# Patient Record
Sex: MALE | URBAN - NONMETROPOLITAN AREA
[De-identification: names, ages, dates, MRNs, and addresses within clinical notes are randomized per-mention and may not be internally consistent; named-entity substitution may affect disease eponyms.]

---

## 2019-04-24 ENCOUNTER — APPOINTMENT (RX ONLY)
Dept: URBAN - NONMETROPOLITAN AREA CLINIC 1 | Facility: CLINIC | Age: 79
Setting detail: DERMATOLOGY
End: 2019-04-24

## 2019-04-24 DIAGNOSIS — L30.9 DERMATITIS, UNSPECIFIED: ICD-10-CM

## 2019-04-24 PROBLEM — F32.9 MAJOR DEPRESSIVE DISORDER, SINGLE EPISODE, UNSPECIFIED: Status: ACTIVE | Noted: 2019-04-24

## 2019-04-24 PROBLEM — F41.9 ANXIETY DISORDER, UNSPECIFIED: Status: ACTIVE | Noted: 2019-04-24

## 2019-04-24 PROBLEM — I10 ESSENTIAL (PRIMARY) HYPERTENSION: Status: ACTIVE | Noted: 2019-04-24

## 2019-04-24 PROBLEM — E78.5 HYPERLIPIDEMIA, UNSPECIFIED: Status: ACTIVE | Noted: 2019-04-24

## 2019-04-24 PROBLEM — E13.9 OTHER SPECIFIED DIABETES MELLITUS WITHOUT COMPLICATIONS: Status: ACTIVE | Noted: 2019-04-24

## 2019-04-24 PROBLEM — L29.8 OTHER PRURITUS: Status: ACTIVE | Noted: 2019-04-24

## 2019-04-24 PROBLEM — K75.9 INFLAMMATORY LIVER DISEASE, UNSPECIFIED: Status: ACTIVE | Noted: 2019-04-24

## 2019-04-24 PROCEDURE — ? ADDITIONAL NOTES

## 2019-04-24 PROCEDURE — ? PRESCRIPTION

## 2019-04-24 PROCEDURE — ? COUNSELING

## 2019-04-24 PROCEDURE — 99202 OFFICE O/P NEW SF 15 MIN: CPT

## 2019-04-24 RX ORDER — MOMETASONE FUROATE 1 MG/G
OINTMENT TOPICAL
Qty: 1 | Refills: 2 | Status: ERX | COMMUNITY
Start: 2019-04-24

## 2019-04-24 RX ORDER — METHYLPREDNISOLONE 4 MG/1
TABLET ORAL
Qty: 1 | Refills: 0 | Status: ERX

## 2019-04-24 RX ADMIN — MOMETASONE FUROATE 1: 1 OINTMENT TOPICAL at 00:00

## 2019-04-24 ASSESSMENT — LOCATION ZONE DERM
LOCATION ZONE: ARM
LOCATION ZONE: LEG

## 2019-04-24 ASSESSMENT — LOCATION SIMPLE DESCRIPTION DERM
LOCATION SIMPLE: RIGHT THIGH
LOCATION SIMPLE: LEFT THIGH
LOCATION SIMPLE: RIGHT UPPER ARM
LOCATION SIMPLE: LEFT UPPER ARM

## 2019-04-24 ASSESSMENT — LOCATION DETAILED DESCRIPTION DERM
LOCATION DETAILED: LEFT ANTERIOR DISTAL UPPER ARM
LOCATION DETAILED: RIGHT ANTERIOR DISTAL UPPER ARM
LOCATION DETAILED: RIGHT ANTERIOR DISTAL THIGH
LOCATION DETAILED: LEFT ANTERIOR DISTAL THIGH

## 2019-04-24 NOTE — PROCEDURE: ADDITIONAL NOTES
Detail Level: Simple
Additional Notes: Pt will bring list of medication with start dates to try and figure out when began certain medications

## 2019-05-08 ENCOUNTER — APPOINTMENT (RX ONLY)
Dept: URBAN - NONMETROPOLITAN AREA CLINIC 1 | Facility: CLINIC | Age: 79
Setting detail: DERMATOLOGY
End: 2019-05-08

## 2019-05-08 DIAGNOSIS — L57.0 ACTINIC KERATOSIS: ICD-10-CM

## 2019-05-08 DIAGNOSIS — L30.9 DERMATITIS, UNSPECIFIED: ICD-10-CM

## 2019-05-08 DIAGNOSIS — D485 NEOPLASM OF UNCERTAIN BEHAVIOR OF SKIN: ICD-10-CM

## 2019-05-08 PROBLEM — D48.5 NEOPLASM OF UNCERTAIN BEHAVIOR OF SKIN: Status: ACTIVE | Noted: 2019-05-08

## 2019-05-08 PROCEDURE — ? BIOPSY BY SHAVE METHOD

## 2019-05-08 PROCEDURE — 11103 TANGNTL BX SKIN EA SEP/ADDL: CPT

## 2019-05-08 PROCEDURE — ? PRESCRIPTION

## 2019-05-08 PROCEDURE — 17000 DESTRUCT PREMALG LESION: CPT | Mod: 59

## 2019-05-08 PROCEDURE — ? ADDITIONAL NOTES

## 2019-05-08 PROCEDURE — 17003 DESTRUCT PREMALG LES 2-14: CPT

## 2019-05-08 PROCEDURE — ? COUNSELING

## 2019-05-08 PROCEDURE — 99213 OFFICE O/P EST LOW 20 MIN: CPT | Mod: 25

## 2019-05-08 PROCEDURE — 11102 TANGNTL BX SKIN SINGLE LES: CPT

## 2019-05-08 PROCEDURE — ? LIQUID NITROGEN

## 2019-05-08 RX ORDER — CLOBETASOL PROPIONATE 0.05 G/ML
SPRAY TOPICAL
Qty: 1 | Refills: 3 | Status: CANCELLED
Stop reason: CLARIF

## 2019-05-08 ASSESSMENT — LOCATION DETAILED DESCRIPTION DERM
LOCATION DETAILED: LEFT NASAL ALA
LOCATION DETAILED: LEFT SUPERIOR HELIX
LOCATION DETAILED: RIGHT MEDIAL MALAR CHEEK
LOCATION DETAILED: LEFT ULNAR DORSAL HAND
LOCATION DETAILED: LEFT CENTRAL MALAR CHEEK
LOCATION DETAILED: LEFT SUPERIOR POSTAURICULAR SKIN
LOCATION DETAILED: LEFT DISTAL DORSAL FOREARM
LOCATION DETAILED: RIGHT FOREHEAD
LOCATION DETAILED: LEFT ANTERIOR DISTAL THIGH
LOCATION DETAILED: RIGHT ANTERIOR DISTAL UPPER ARM
LOCATION DETAILED: RIGHT ANTERIOR DISTAL THIGH
LOCATION DETAILED: LEFT CENTRAL TEMPLE
LOCATION DETAILED: RIGHT SUPERIOR HELIX
LOCATION DETAILED: RIGHT PROXIMAL RADIAL DORSAL FOREARM
LOCATION DETAILED: LEFT ANTERIOR DISTAL UPPER ARM
LOCATION DETAILED: RIGHT PROXIMAL DORSAL FOREARM
LOCATION DETAILED: RIGHT NASAL ALA
LOCATION DETAILED: LEFT EYEBROW
LOCATION DETAILED: LEFT SUPERIOR MEDIAL MALAR CHEEK

## 2019-05-08 ASSESSMENT — LOCATION SIMPLE DESCRIPTION DERM
LOCATION SIMPLE: RIGHT FOREHEAD
LOCATION SIMPLE: LEFT EAR
LOCATION SIMPLE: LEFT HAND
LOCATION SIMPLE: LEFT FOREARM
LOCATION SIMPLE: LEFT TEMPLE
LOCATION SIMPLE: LEFT EYEBROW
LOCATION SIMPLE: LEFT THIGH
LOCATION SIMPLE: RIGHT FOREARM
LOCATION SIMPLE: RIGHT CHEEK
LOCATION SIMPLE: SCALP
LOCATION SIMPLE: LEFT UPPER ARM
LOCATION SIMPLE: RIGHT NOSE
LOCATION SIMPLE: RIGHT EAR
LOCATION SIMPLE: RIGHT UPPER ARM
LOCATION SIMPLE: LEFT NOSE
LOCATION SIMPLE: RIGHT THIGH
LOCATION SIMPLE: LEFT CHEEK

## 2019-05-08 ASSESSMENT — LOCATION ZONE DERM
LOCATION ZONE: SCALP
LOCATION ZONE: HAND
LOCATION ZONE: FACE
LOCATION ZONE: ARM
LOCATION ZONE: LEG
LOCATION ZONE: NOSE
LOCATION ZONE: EAR

## 2019-05-08 NOTE — PROCEDURE: BIOPSY BY SHAVE METHOD
Consent: Written consent was obtained and risk was reviewed  per signed  consent form. Biopsy was performed with patient verbal permission.
Lab: -[id number]
Curettage Text: The wound bed was treated with curettage after the biopsy was performed.
Detail Level: Detailed
Hemostasis: Aluminum Chloride
Bill For Surgical Tray: no
Biopsy Method: Personna blade
Notification Instructions: Patient will be notified of biopsy results. However, patient instructed to call the office if not contacted within 2 weeks.
Was A Bandage Applied: Yes
Silver Nitrate Text: The wound bed was treated with silver nitrate after the biopsy was performed.
Dressing: bandage
Post-care instructions were given and  reviewed with the patient in detail. Patient is to keep the biopsy site dry overnight, and then apply healing ointment daily until healed. Patient is instructed to call for any questions or problems.
Anesthesia Volume In Cc (Will Not Render If 0): 0.5
Electrodesiccation And Curettage Text: The wound bed was treated with electrodesiccation and curettage after the biopsy was performed.
Type Of Destruction Used: Curettage
Billing Type: Third-Party Bill
Biopsy Type: H and E
X Size Of Lesion In Cm: 0
Electrodesiccation Text: The wound bed was treated with electrodesiccation after the biopsy was performed.
Depth Of Biopsy: dermis
Wound Care: Petrolatum
Anesthesia Type: 2% lidocaine with epinephrine and a 1:10 solution of 8.4% sodium bicarbonate
Lab Facility: 3
Cryotherapy Text: The wound bed was treated with cryotherapy after the biopsy was performed.
Size Of Lesion In Cm: 1.2

## 2019-05-23 ENCOUNTER — APPOINTMENT (RX ONLY)
Dept: URBAN - NONMETROPOLITAN AREA CLINIC 1 | Facility: CLINIC | Age: 79
Setting detail: DERMATOLOGY
End: 2019-05-23

## 2019-05-23 PROBLEM — D03.61 MELANOMA IN SITU OF RIGHT UPPER LIMB, INCLUDING SHOULDER: Status: ACTIVE | Noted: 2019-05-23

## 2019-05-23 PROCEDURE — ? EXCISION

## 2019-05-23 PROCEDURE — 12032 INTMD RPR S/A/T/EXT 2.6-7.5: CPT

## 2019-05-23 PROCEDURE — 11604 EXC TR-EXT MAL+MARG 3.1-4 CM: CPT

## 2019-05-23 PROCEDURE — ? PRESCRIPTION

## 2019-05-23 RX ORDER — CEPHALEXIN 500 MG/1
CAPSULE ORAL
Qty: 15 | Refills: 0 | Status: ERX

## 2019-05-23 NOTE — PROCEDURE: EXCISION
Post-Care Instructions: Detailed post-care instructions were reviewed with the patient. Patient verbalized understanding not to engage in any heavy lifting, exercise, or swimming for the next 14 days. Should the patient develop any fevers, chills, bleeding, severe pain, or having any problems patient will contact the office at [phone] or urgent care immediately. Acetaminophen is recommended for pain relief . Patient should avoid  ibuprofen ( Advil, Motrin), aspirin, naproxen ( Aleve) .

## 2019-06-05 ENCOUNTER — APPOINTMENT (RX ONLY)
Dept: URBAN - NONMETROPOLITAN AREA CLINIC 1 | Facility: CLINIC | Age: 79
Setting detail: DERMATOLOGY
End: 2019-06-05

## 2019-06-05 DIAGNOSIS — Z48.02 ENCOUNTER FOR REMOVAL OF SUTURES: ICD-10-CM

## 2019-06-05 DIAGNOSIS — L30.9 DERMATITIS, UNSPECIFIED: ICD-10-CM

## 2019-06-05 PROCEDURE — 99213 OFFICE O/P EST LOW 20 MIN: CPT

## 2019-06-05 PROCEDURE — ? ADDITIONAL NOTES

## 2019-06-05 PROCEDURE — ? COUNSELING

## 2019-06-05 PROCEDURE — 99024 POSTOP FOLLOW-UP VISIT: CPT

## 2019-06-05 PROCEDURE — ? SUTURE REMOVAL (GLOBAL PERIOD)

## 2019-06-05 ASSESSMENT — LOCATION SIMPLE DESCRIPTION DERM
LOCATION SIMPLE: RIGHT FOREARM
LOCATION SIMPLE: RIGHT UPPER ARM
LOCATION SIMPLE: LEFT UPPER ARM

## 2019-06-05 ASSESSMENT — LOCATION ZONE DERM: LOCATION ZONE: ARM

## 2019-06-05 ASSESSMENT — LOCATION DETAILED DESCRIPTION DERM
LOCATION DETAILED: RIGHT ANTERIOR DISTAL UPPER ARM
LOCATION DETAILED: LEFT ANTERIOR DISTAL UPPER ARM
LOCATION DETAILED: RIGHT DISTAL DORSAL FOREARM

## 2019-06-05 NOTE — PROCEDURE: SUTURE REMOVAL (GLOBAL PERIOD)
Add 93681 Cpt? (Important Note: In 2017 The Use Of 13021 Is Being Tracked By Cms To Determine Future Global Period Reimbursement For Global Periods): yes
Detail Level: Detailed

## 2019-06-05 NOTE — PROCEDURE: ADDITIONAL NOTES
Detail Level: Simple
Additional Notes: Possible rash started from HCTZ and or Eplcusa. Will continue dermaZinc with clobetasol.
Additional Notes: Umpire in ointment applied to wound after suture removal and wrapped with Telfa, Kerlix, and a cohesive bandage.

## 2019-06-13 ENCOUNTER — APPOINTMENT (RX ONLY)
Dept: URBAN - NONMETROPOLITAN AREA CLINIC 1 | Facility: CLINIC | Age: 79
Setting detail: DERMATOLOGY
End: 2019-06-13

## 2019-06-13 PROBLEM — C44.311 BASAL CELL CARCINOMA OF SKIN OF NOSE: Status: ACTIVE | Noted: 2019-06-13

## 2019-06-13 PROBLEM — C44.329 SQUAMOUS CELL CARCINOMA OF SKIN OF OTHER PARTS OF FACE: Status: ACTIVE | Noted: 2019-06-13

## 2019-06-13 PROCEDURE — ? ADDITIONAL NOTES

## 2019-06-13 PROCEDURE — ? COUNSELING

## 2019-06-13 NOTE — PROCEDURE: ADDITIONAL NOTES
Detail Level: Detailed
Additional Notes: RTO 1 month for Mohs surgery.\\nFinish Hep C treatment.
Additional Notes: RTO for Mohs in 1 month.\\nFinish Hep C medication.

## 2019-08-06 ENCOUNTER — APPOINTMENT (RX ONLY)
Dept: URBAN - NONMETROPOLITAN AREA CLINIC 1 | Facility: CLINIC | Age: 79
Setting detail: DERMATOLOGY
End: 2019-08-06

## 2019-08-06 DIAGNOSIS — L81.4 OTHER MELANIN HYPERPIGMENTATION: ICD-10-CM

## 2019-08-06 DIAGNOSIS — D485 NEOPLASM OF UNCERTAIN BEHAVIOR OF SKIN: ICD-10-CM

## 2019-08-06 DIAGNOSIS — Z85.820 PERSONAL HISTORY OF MALIGNANT MELANOMA OF SKIN: ICD-10-CM

## 2019-08-06 DIAGNOSIS — D18.0 HEMANGIOMA: ICD-10-CM

## 2019-08-06 DIAGNOSIS — L57.0 ACTINIC KERATOSIS: ICD-10-CM

## 2019-08-06 DIAGNOSIS — D22 MELANOCYTIC NEVI: ICD-10-CM

## 2019-08-06 DIAGNOSIS — L82.1 OTHER SEBORRHEIC KERATOSIS: ICD-10-CM

## 2019-08-06 PROBLEM — D22.5 MELANOCYTIC NEVI OF TRUNK: Status: ACTIVE | Noted: 2019-08-06

## 2019-08-06 PROBLEM — D18.01 HEMANGIOMA OF SKIN AND SUBCUTANEOUS TISSUE: Status: ACTIVE | Noted: 2019-08-06

## 2019-08-06 PROBLEM — D48.5 NEOPLASM OF UNCERTAIN BEHAVIOR OF SKIN: Status: ACTIVE | Noted: 2019-08-06

## 2019-08-06 PROCEDURE — 17003 DESTRUCT PREMALG LES 2-14: CPT

## 2019-08-06 PROCEDURE — ? BIOPSY BY SHAVE METHOD

## 2019-08-06 PROCEDURE — ? COUNSELING

## 2019-08-06 PROCEDURE — ? LIQUID NITROGEN

## 2019-08-06 PROCEDURE — ? SUNSCREEN RECOMMENDATIONS

## 2019-08-06 PROCEDURE — 11102 TANGNTL BX SKIN SINGLE LES: CPT

## 2019-08-06 PROCEDURE — 99213 OFFICE O/P EST LOW 20 MIN: CPT | Mod: 25

## 2019-08-06 PROCEDURE — ? INVENTORY

## 2019-08-06 PROCEDURE — 17000 DESTRUCT PREMALG LESION: CPT | Mod: 59

## 2019-08-06 ASSESSMENT — LOCATION ZONE DERM
LOCATION ZONE: ARM
LOCATION ZONE: TRUNK
LOCATION ZONE: FACE
LOCATION ZONE: NOSE
LOCATION ZONE: EAR

## 2019-08-06 ASSESSMENT — LOCATION DETAILED DESCRIPTION DERM
LOCATION DETAILED: RIGHT ANTIHELIX
LOCATION DETAILED: RIGHT SCAPHA
LOCATION DETAILED: RIGHT TRAGUS
LOCATION DETAILED: LEFT VENTRAL PROXIMAL FOREARM
LOCATION DETAILED: RIGHT ANTERIOR DISTAL UPPER ARM
LOCATION DETAILED: RIGHT NASAL ALA
LOCATION DETAILED: LEFT MID-UPPER BACK
LOCATION DETAILED: LEFT INFERIOR UPPER BACK
LOCATION DETAILED: RIGHT CENTRAL MALAR CHEEK

## 2019-08-06 ASSESSMENT — LOCATION SIMPLE DESCRIPTION DERM
LOCATION SIMPLE: LEFT UPPER BACK
LOCATION SIMPLE: LEFT FOREARM
LOCATION SIMPLE: RIGHT EAR
LOCATION SIMPLE: RIGHT NOSE
LOCATION SIMPLE: RIGHT UPPER ARM
LOCATION SIMPLE: RIGHT CHEEK

## 2019-08-06 NOTE — PROCEDURE: BIOPSY BY SHAVE METHOD
Billing Type: Third-Party Bill
Lab: -[id number]
Curettage Text: The wound bed was treated with curettage after the biopsy was performed.
Destruction After The Procedure: No
Post-care instructions were given and  reviewed with the patient in detail. Patient is to keep the biopsy site dry overnight, and then apply healing ointment daily until healed. Patient is instructed to call for any questions or problems.
Cryotherapy Text: The wound bed was treated with cryotherapy after the biopsy was performed.
Size Of Lesion In Cm: 1.5
Anesthesia Type: 2% lidocaine with epinephrine and a 1:10 solution of 8.4% sodium bicarbonate
Lab Facility: 3
Depth Of Biopsy: dermis
Wound Care: Petrolatum
Additional Anesthesia Volume In Cc (Will Not Render If 0): 0
Electrodesiccation Text: The wound bed was treated with electrodesiccation after the biopsy was performed.
Notification Instructions: Patient will be notified of biopsy results. However, patient instructed to call the office if not contacted within 2 weeks.
Biopsy Type: H and E
Type Of Destruction Used: Curettage
Consent: Written consent was obtained and risk was reviewed  per signed  consent form. Biopsy was performed with patient verbal permission.
Electrodesiccation And Curettage Text: The wound bed was treated with electrodesiccation and curettage after the biopsy was performed.
Anesthesia Volume In Cc (Will Not Render If 0): 0.5
Render Post-Care Instructions In Note?: yes
Dressing: bandage
Silver Nitrate Text: The wound bed was treated with silver nitrate after the biopsy was performed.
Biopsy Method: Personna blade
Hemostasis: Aluminum Chloride
Detail Level: Detailed

## 2019-08-15 ENCOUNTER — APPOINTMENT (RX ONLY)
Dept: URBAN - NONMETROPOLITAN AREA CLINIC 1 | Facility: CLINIC | Age: 79
Setting detail: DERMATOLOGY
End: 2019-08-15

## 2019-08-15 VITALS — DIASTOLIC BLOOD PRESSURE: 64 MMHG | SYSTOLIC BLOOD PRESSURE: 128 MMHG | RESPIRATION RATE: 56 BRPM

## 2019-08-15 PROBLEM — C44.329 SQUAMOUS CELL CARCINOMA OF SKIN OF OTHER PARTS OF FACE: Status: ACTIVE | Noted: 2019-08-15

## 2019-08-15 PROBLEM — C44.311 BASAL CELL CARCINOMA OF SKIN OF NOSE: Status: ACTIVE | Noted: 2019-08-15

## 2019-08-15 PROCEDURE — 17312 MOHS ADDL STAGE: CPT | Mod: 79

## 2019-08-15 PROCEDURE — ? MOHS SURGERY

## 2019-08-15 PROCEDURE — 12052 INTMD RPR FACE/MM 2.6-5.0 CM: CPT | Mod: 79

## 2019-08-15 PROCEDURE — 17311 MOHS 1 STAGE H/N/HF/G: CPT | Mod: 76,79

## 2019-08-15 PROCEDURE — 17311 MOHS 1 STAGE H/N/HF/G: CPT | Mod: 79

## 2019-08-15 NOTE — HPI: PROCEDURE (MOHS)
Has The Growth Been Previously Biopsied?: has been previously biopsied
Body Location Override (Optional): Right nasal ala
Body Location Override (Optional): Left central temple

## 2019-08-16 ENCOUNTER — APPOINTMENT (RX ONLY)
Dept: URBAN - NONMETROPOLITAN AREA CLINIC 1 | Facility: CLINIC | Age: 79
Setting detail: DERMATOLOGY
End: 2019-08-16

## 2019-08-16 VITALS — SYSTOLIC BLOOD PRESSURE: 183 MMHG | DIASTOLIC BLOOD PRESSURE: 97 MMHG

## 2019-08-16 PROBLEM — C44.311 BASAL CELL CARCINOMA OF SKIN OF NOSE: Status: ACTIVE | Noted: 2019-08-16

## 2019-08-16 PROCEDURE — ? MOHS SURGERY

## 2019-08-16 PROCEDURE — 17311 MOHS 1 STAGE H/N/HF/G: CPT | Mod: 79

## 2019-08-16 PROCEDURE — 17312 MOHS ADDL STAGE: CPT | Mod: 79

## 2019-09-05 ENCOUNTER — APPOINTMENT (RX ONLY)
Dept: URBAN - NONMETROPOLITAN AREA CLINIC 1 | Facility: CLINIC | Age: 79
Setting detail: DERMATOLOGY
End: 2019-09-05

## 2019-09-05 DIAGNOSIS — L57.0 ACTINIC KERATOSIS: ICD-10-CM

## 2019-09-05 DIAGNOSIS — L30.9 DERMATITIS, UNSPECIFIED: ICD-10-CM | Status: RESOLVED

## 2019-09-05 PROCEDURE — ? COUNSELING

## 2019-09-05 PROCEDURE — ? PRESCRIPTION SAMPLES PROVIDED

## 2019-09-05 PROCEDURE — ? ADDITIONAL NOTES

## 2019-09-05 PROCEDURE — 99213 OFFICE O/P EST LOW 20 MIN: CPT

## 2019-09-05 RX ORDER — FLUOROURACIL 50 MG/G
CREAM TOPICAL
Qty: 1 | Refills: 1 | Status: CANCELLED

## 2019-09-05 ASSESSMENT — LOCATION DETAILED DESCRIPTION DERM
LOCATION DETAILED: RIGHT DISTAL DORSAL FOREARM
LOCATION DETAILED: RIGHT PROXIMAL RADIAL DORSAL FOREARM
LOCATION DETAILED: LEFT LATERAL BUTTOCK
LOCATION DETAILED: LEFT PROXIMAL DORSAL FOREARM
LOCATION DETAILED: LEFT DISTAL DORSAL FOREARM

## 2019-09-05 ASSESSMENT — LOCATION SIMPLE DESCRIPTION DERM
LOCATION SIMPLE: LEFT BUTTOCK
LOCATION SIMPLE: LEFT FOREARM
LOCATION SIMPLE: RIGHT FOREARM

## 2019-09-05 ASSESSMENT — LOCATION ZONE DERM
LOCATION ZONE: ARM
LOCATION ZONE: TRUNK

## 2019-09-05 NOTE — PROCEDURE: MIPS QUALITY
Quality 431: Preventive Care And Screening: Unhealthy Alcohol Use - Screening: Patient screened for unhealthy alcohol use using a single question and scores less than 2 times per year
Quality 402: Tobacco Use And Help With Quitting Among Adolescents: Patient screened for tobacco and is a smoker AND received Cessation Counseling
Detail Level: Detailed
Quality 130: Documentation Of Current Medications In The Medical Record: Current Medications Documented

## 2019-09-05 NOTE — PROCEDURE: ADDITIONAL NOTES
Additional Notes: Discussed ALA/PDT vs 5FU cream for treatment options. Patient prefers pdt. Will schedule for pdt both arms 1.5 hours incubation time \\nDiscussed expectations of pd and side effects and expectations after treatment \\nHandout given on pdt \\nContinue wearing long sleeves and sunscreen daily, use dove soap sensitive skin and moisturize daily
Detail Level: Detailed

## 2019-10-30 ENCOUNTER — APPOINTMENT (RX ONLY)
Dept: URBAN - NONMETROPOLITAN AREA CLINIC 1 | Facility: CLINIC | Age: 79
Setting detail: DERMATOLOGY
End: 2019-10-30

## 2019-10-30 DIAGNOSIS — L30.9 DERMATITIS, UNSPECIFIED: ICD-10-CM

## 2019-10-30 PROCEDURE — ? ADDITIONAL NOTES

## 2019-10-30 PROCEDURE — ? COUNSELING

## 2019-10-30 PROCEDURE — 99213 OFFICE O/P EST LOW 20 MIN: CPT

## 2019-10-30 ASSESSMENT — LOCATION SIMPLE DESCRIPTION DERM
LOCATION SIMPLE: RIGHT FOREARM
LOCATION SIMPLE: LEFT FOREARM

## 2019-10-30 ASSESSMENT — LOCATION DETAILED DESCRIPTION DERM
LOCATION DETAILED: LEFT PROXIMAL DORSAL FOREARM
LOCATION DETAILED: RIGHT PROXIMAL RADIAL DORSAL FOREARM

## 2019-10-30 ASSESSMENT — SEVERITY ASSESSMENT: SEVERITY: MODERATE

## 2019-10-30 ASSESSMENT — LOCATION ZONE DERM: LOCATION ZONE: ARM

## 2019-10-30 NOTE — HPI: RASH
What Type Of Note Output Would You Prefer (Optional)?: Bullet Format
How Severe Is Your Rash?: mild
Is This A New Presentation, Or A Follow-Up?: Rash
Additional History: Patient has a container states he pulled out a worm out of his skin

## 2019-10-30 NOTE — PROCEDURE: ADDITIONAL NOTES
Detail Level: Simple
Additional Notes: Excoriated papules bilateral arms , ears. May try sorbelene cream bid.

## 2019-11-27 ENCOUNTER — APPOINTMENT (RX ONLY)
Dept: URBAN - NONMETROPOLITAN AREA CLINIC 1 | Facility: CLINIC | Age: 79
Setting detail: DERMATOLOGY
End: 2019-11-27

## 2019-11-27 DIAGNOSIS — L30.9 DERMATITIS, UNSPECIFIED: ICD-10-CM | Status: IMPROVED

## 2019-11-27 PROCEDURE — 99213 OFFICE O/P EST LOW 20 MIN: CPT

## 2019-11-27 PROCEDURE — ? PRESCRIPTION SAMPLES PROVIDED

## 2019-11-27 PROCEDURE — ? COUNSELING

## 2019-11-27 PROCEDURE — ? FULL BODY SKIN EXAM - DECLINED

## 2019-11-27 ASSESSMENT — LOCATION DETAILED DESCRIPTION DERM
LOCATION DETAILED: RIGHT PROXIMAL RADIAL DORSAL FOREARM
LOCATION DETAILED: LEFT PROXIMAL DORSAL FOREARM

## 2019-11-27 ASSESSMENT — LOCATION ZONE DERM: LOCATION ZONE: ARM

## 2019-11-27 ASSESSMENT — LOCATION SIMPLE DESCRIPTION DERM
LOCATION SIMPLE: RIGHT FOREARM
LOCATION SIMPLE: LEFT FOREARM

## 2019-12-11 ENCOUNTER — APPOINTMENT (RX ONLY)
Dept: URBAN - NONMETROPOLITAN AREA CLINIC 1 | Facility: CLINIC | Age: 79
Setting detail: DERMATOLOGY
End: 2019-12-11

## 2019-12-11 DIAGNOSIS — L27.0 GENERALIZED SKIN ERUPTION DUE TO DRUGS AND MEDICAMENTS TAKEN INTERNALLY: ICD-10-CM

## 2019-12-11 PROBLEM — L30.9 DERMATITIS, UNSPECIFIED: Status: ACTIVE | Noted: 2019-12-11

## 2019-12-11 PROCEDURE — ? RETURN TO REFERRING PROVIDER

## 2019-12-11 PROCEDURE — ? COUNSELING

## 2019-12-11 PROCEDURE — ? ADDITIONAL NOTES

## 2019-12-11 PROCEDURE — ? FULL BODY SKIN EXAM - DECLINED

## 2019-12-11 PROCEDURE — ? REFERRAL CORRESPONDENCE

## 2019-12-11 PROCEDURE — 99213 OFFICE O/P EST LOW 20 MIN: CPT

## 2019-12-11 ASSESSMENT — LOCATION SIMPLE DESCRIPTION DERM
LOCATION SIMPLE: RIGHT FOREARM
LOCATION SIMPLE: LEFT FOREARM

## 2019-12-11 ASSESSMENT — LOCATION ZONE DERM: LOCATION ZONE: ARM

## 2019-12-11 NOTE — PROCEDURE: ADDITIONAL NOTES
Detail Level: Simple
Additional Notes: Patient reports rash starting in April, after stopping Hepatitis C treatment.\\n\\nDiscussed skin sensitivity due to hydrochlorothiazide and cholesterol medication.\\n\\n1:Vinegar - 3:Water\\n\\nRecommended wearing long sleeves at night.\\n\\nMay continue to use CeraVe Healing Ointment as needed.

## 2020-01-15 ENCOUNTER — APPOINTMENT (RX ONLY)
Dept: URBAN - NONMETROPOLITAN AREA CLINIC 1 | Facility: CLINIC | Age: 80
Setting detail: DERMATOLOGY
End: 2020-01-15

## 2020-01-15 DIAGNOSIS — L27.0 GENERALIZED SKIN ERUPTION DUE TO DRUGS AND MEDICAMENTS TAKEN INTERNALLY: ICD-10-CM

## 2020-01-15 PROBLEM — L30.9 DERMATITIS, UNSPECIFIED: Status: ACTIVE | Noted: 2020-01-15

## 2020-01-15 PROCEDURE — ? FULL BODY SKIN EXAM - DECLINED

## 2020-01-15 PROCEDURE — ? REFERRAL CORRESPONDENCE

## 2020-01-15 PROCEDURE — ? COUNSELING

## 2020-01-15 PROCEDURE — ? ADDITIONAL NOTES

## 2020-01-15 PROCEDURE — 99213 OFFICE O/P EST LOW 20 MIN: CPT

## 2020-01-15 NOTE — PROCEDURE: ADDITIONAL NOTES
Additional Notes: Recommended Aquaphor Healing ointment.\\n\\nEncouraged patient to have bloodwork completed for thyroid testing.
Detail Level: Simple

## 2020-02-11 ENCOUNTER — APPOINTMENT (RX ONLY)
Dept: URBAN - NONMETROPOLITAN AREA CLINIC 1 | Facility: CLINIC | Age: 80
Setting detail: DERMATOLOGY
End: 2020-02-11

## 2020-02-11 DIAGNOSIS — L57.0 ACTINIC KERATOSIS: ICD-10-CM

## 2020-02-11 DIAGNOSIS — Z85.820 PERSONAL HISTORY OF MALIGNANT MELANOMA OF SKIN: ICD-10-CM

## 2020-02-11 DIAGNOSIS — D22 MELANOCYTIC NEVI: ICD-10-CM

## 2020-02-11 DIAGNOSIS — Z85.828 PERSONAL HISTORY OF OTHER MALIGNANT NEOPLASM OF SKIN: ICD-10-CM

## 2020-02-11 DIAGNOSIS — D18.0 HEMANGIOMA: ICD-10-CM

## 2020-02-11 DIAGNOSIS — L81.4 OTHER MELANIN HYPERPIGMENTATION: ICD-10-CM

## 2020-02-11 DIAGNOSIS — L27.0 GENERALIZED SKIN ERUPTION DUE TO DRUGS AND MEDICAMENTS TAKEN INTERNALLY: ICD-10-CM | Status: INADEQUATELY CONTROLLED

## 2020-02-11 DIAGNOSIS — L82.1 OTHER SEBORRHEIC KERATOSIS: ICD-10-CM

## 2020-02-11 PROBLEM — D22.5 MELANOCYTIC NEVI OF TRUNK: Status: ACTIVE | Noted: 2020-02-11

## 2020-02-11 PROBLEM — L30.9 DERMATITIS, UNSPECIFIED: Status: ACTIVE | Noted: 2020-02-11

## 2020-02-11 PROBLEM — D18.01 HEMANGIOMA OF SKIN AND SUBCUTANEOUS TISSUE: Status: ACTIVE | Noted: 2020-02-11

## 2020-02-11 PROCEDURE — ? FULL BODY SKIN EXAM - DECLINED

## 2020-02-11 PROCEDURE — ? REFERRAL CORRESPONDENCE

## 2020-02-11 PROCEDURE — ? ADDITIONAL NOTES

## 2020-02-11 PROCEDURE — ? LIQUID NITROGEN

## 2020-02-11 PROCEDURE — 17000 DESTRUCT PREMALG LESION: CPT

## 2020-02-11 PROCEDURE — ? SUNSCREEN RECOMMENDATIONS

## 2020-02-11 PROCEDURE — ? COUNSELING

## 2020-02-11 PROCEDURE — 17003 DESTRUCT PREMALG LES 2-14: CPT

## 2020-02-11 PROCEDURE — 99214 OFFICE O/P EST MOD 30 MIN: CPT | Mod: 25

## 2020-02-11 PROCEDURE — ? FULL BODY SKIN EXAM

## 2020-02-11 ASSESSMENT — LOCATION DETAILED DESCRIPTION DERM
LOCATION DETAILED: LEFT INFERIOR UPPER BACK
LOCATION DETAILED: LEFT MID-UPPER BACK
LOCATION DETAILED: LEFT FOREHEAD
LOCATION DETAILED: LEFT CENTRAL EYEBROW
LOCATION DETAILED: LEFT INFERIOR FOREHEAD

## 2020-02-11 ASSESSMENT — LOCATION SIMPLE DESCRIPTION DERM
LOCATION SIMPLE: LEFT FOREHEAD
LOCATION SIMPLE: LEFT UPPER BACK
LOCATION SIMPLE: LEFT EYEBROW

## 2020-02-11 ASSESSMENT — LOCATION ZONE DERM
LOCATION ZONE: FACE
LOCATION ZONE: TRUNK

## 2020-02-11 NOTE — PROCEDURE: LIQUID NITROGEN
Duration Of Freeze Thaw-Cycle (Seconds): 2
Render Note In Bullet Format When Appropriate: No
Consent: The patient's consent was obtained including but not limited to risks of crusting, scabbing, blistering, scarring, darker or lighter pigmentary change, recurrence, incomplete removal and infection.
Detail Level: Simple
Post-Care Instructions: I reviewed with the patient in detail post-care instructions. Patient is to wear sunprotection, and avoid picking at any of the treated lesions. Pt may apply Vaseline to crusted or scabbing areas.

## 2020-02-11 NOTE — PROCEDURE: ADDITIONAL NOTES
Additional Notes: Recommended Aquaphor Healing ointment. Cerave healing ointment and exederm anti iching cream. Bpo 10% wash.\\n\\nEncouraged patient to have bloodwork completed for thyroid testing.
Detail Level: Simple

## 2020-04-05 NOTE — PROCEDURE: FULL BODY SKIN EXAM
Price (Do Not Change): 0.00
Detail Level: Simple
Instructions: This plan will send the code FBSE to the PM system.  DO NOT or CHANGE the price.
none

## 2020-04-28 ENCOUNTER — APPOINTMENT (RX ONLY)
Dept: URBAN - NONMETROPOLITAN AREA CLINIC 1 | Facility: CLINIC | Age: 80
Setting detail: DERMATOLOGY
End: 2020-04-28

## 2020-04-28 DIAGNOSIS — L29.89 OTHER PRURITUS: ICD-10-CM | Status: WORSENING

## 2020-04-28 DIAGNOSIS — L29.8 OTHER PRURITUS: ICD-10-CM | Status: WORSENING

## 2020-04-28 PROBLEM — L29.9 PRURITUS, UNSPECIFIED: Status: ACTIVE | Noted: 2020-04-28

## 2020-04-28 PROCEDURE — ? ADDITIONAL NOTES

## 2020-04-28 PROCEDURE — ? FULL BODY SKIN EXAM - DECLINED

## 2020-04-28 PROCEDURE — ? COUNSELING

## 2020-04-28 PROCEDURE — ? PRESCRIPTION

## 2020-04-28 PROCEDURE — 99213 OFFICE O/P EST LOW 20 MIN: CPT

## 2020-04-28 RX ORDER — PERMETHRIN 50 MG/G
CREAM TOPICAL
Qty: 1 | Refills: 0 | Status: ERX | COMMUNITY
Start: 2020-04-28

## 2020-04-28 RX ADMIN — PERMETHRIN: 50 CREAM TOPICAL at 00:00

## 2020-04-28 ASSESSMENT — LOCATION DETAILED DESCRIPTION DERM
LOCATION DETAILED: LEFT PROXIMAL DORSAL FOREARM
LOCATION DETAILED: RIGHT PROXIMAL DORSAL FOREARM
LOCATION DETAILED: RIGHT SUPERIOR MEDIAL LOWER BACK

## 2020-04-28 ASSESSMENT — LOCATION ZONE DERM
LOCATION ZONE: TRUNK
LOCATION ZONE: ARM

## 2020-04-28 ASSESSMENT — LOCATION SIMPLE DESCRIPTION DERM
LOCATION SIMPLE: RIGHT LOWER BACK
LOCATION SIMPLE: RIGHT FOREARM
LOCATION SIMPLE: LEFT FOREARM

## 2020-04-28 NOTE — PROCEDURE: ADDITIONAL NOTES
Additional Notes: Medically necessary for treatment due to keeping patient out of ER or a PCP.\\nRecommended gabapentin 300 mg 1 po bid.\\nRecommended permethrin cream 5% once daily to arms, and back.\\nWill consider biopsy in 4 weeks if no improvement.
Detail Level: Detailed
Additional Notes: Patient consent was obtained to proceed with the visit and recommended plan of care after discussion of all risks and benefits, including the risks of COVID-19 exposure.
Detail Level: Simple

## 2020-04-28 NOTE — PROCEDURE: MIPS QUALITY
Detail Level: Detailed
Quality 402: Tobacco Use And Help With Quitting Among Adolescents: Patient screened for tobacco and is a smoker AND received Cessation Counseling
Quality 130: Documentation Of Current Medications In The Medical Record: Current Medications Documented

## 2020-04-28 NOTE — HPI: ITCHING
How Did Your Itching Occur?: sudden in onset (over a period of weeks to a few months)
How Severe Is Your Itching?: moderate
Additional History: Patient states this rash or itching stated when he finished his hepatitis c treatment. Patient was possibly positive with hepatitis c in late 1980’s and treatment and diagnosed with hepatitis c in early 1990’s. 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 Patient states he had some liver damage and that he would test positive for hepatitis C but no active hepatitis C. Patient presents rash on outside arms, back, and ears. Patient works as a  and is outside a lot.

## 2020-05-26 ENCOUNTER — APPOINTMENT (RX ONLY)
Dept: URBAN - NONMETROPOLITAN AREA CLINIC 1 | Facility: CLINIC | Age: 80
Setting detail: DERMATOLOGY
End: 2020-05-26

## 2020-05-26 DIAGNOSIS — L98.1 FACTITIAL DERMATITIS: ICD-10-CM | Status: UNCHANGED

## 2020-05-26 PROBLEM — L29.9 PRURITUS, UNSPECIFIED: Status: ACTIVE | Noted: 2020-05-26

## 2020-05-26 PROBLEM — D48.5 NEOPLASM OF UNCERTAIN BEHAVIOR OF SKIN: Status: ACTIVE | Noted: 2020-05-26

## 2020-05-26 PROCEDURE — ? FULL BODY SKIN EXAM - DECLINED

## 2020-05-26 PROCEDURE — 99213 OFFICE O/P EST LOW 20 MIN: CPT | Mod: 25

## 2020-05-26 PROCEDURE — ? COUNSELING

## 2020-05-26 PROCEDURE — ? PRESCRIPTION

## 2020-05-26 PROCEDURE — ? MEDICATION COUNSELING

## 2020-05-26 PROCEDURE — 11104 PUNCH BX SKIN SINGLE LESION: CPT

## 2020-05-26 PROCEDURE — ? BIOPSY BY PUNCH METHOD

## 2020-05-26 PROCEDURE — ? ADDITIONAL NOTES

## 2020-05-26 RX ORDER — PERMETHRIN 50 MG/G
CREAM TOPICAL
Qty: 1 | Refills: 0 | Status: ERX

## 2020-05-26 ASSESSMENT — LOCATION DETAILED DESCRIPTION DERM
LOCATION DETAILED: LEFT SUPERIOR UPPER BACK
LOCATION DETAILED: LEFT PROXIMAL DORSAL FOREARM
LOCATION DETAILED: RIGHT PROXIMAL RADIAL DORSAL FOREARM

## 2020-05-26 ASSESSMENT — LOCATION ZONE DERM
LOCATION ZONE: TRUNK
LOCATION ZONE: ARM

## 2020-05-26 ASSESSMENT — LOCATION SIMPLE DESCRIPTION DERM
LOCATION SIMPLE: LEFT UPPER BACK
LOCATION SIMPLE: RIGHT FOREARM
LOCATION SIMPLE: LEFT FOREARM

## 2020-05-26 NOTE — PROCEDURE: MEDICATION COUNSELING
Xelsilkez Pregnancy And Lactation Text: This medication is Pregnancy Category D and is not considered safe during pregnancy.  The risk during breast feeding is also uncertain.

## 2020-05-26 NOTE — PROCEDURE: ADDITIONAL NOTES
Additional Notes: Specimen sent to [facility].
Detail Level: Detailed
Additional Notes: Encouraged patient to take his medication as prescribed.\\nPatient denied any side effects from medication.
Additional Notes: Patient consent was obtained to proceed with the visit and recommended plan of care after discussion of all risks and benefits, including the risks of COVID-19 exposure.
Detail Level: Simple

## 2020-06-02 ENCOUNTER — APPOINTMENT (RX ONLY)
Dept: URBAN - NONMETROPOLITAN AREA CLINIC 1 | Facility: CLINIC | Age: 80
Setting detail: DERMATOLOGY
End: 2020-06-02

## 2020-06-02 ENCOUNTER — RX ONLY (OUTPATIENT)
Age: 80
Setting detail: RX ONLY
End: 2020-06-02

## 2020-06-02 DIAGNOSIS — Z48.02 ENCOUNTER FOR REMOVAL OF SUTURES: ICD-10-CM

## 2020-06-02 PROCEDURE — 99024 POSTOP FOLLOW-UP VISIT: CPT

## 2020-06-02 PROCEDURE — ? ADDITIONAL NOTES

## 2020-06-02 PROCEDURE — ? SUTURE REMOVAL (GLOBAL PERIOD)

## 2020-06-02 RX ORDER — PERMETHRIN 50 MG/G
CREAM TOPICAL
Qty: 1 | Refills: 0 | Status: ERX

## 2020-06-02 ASSESSMENT — LOCATION SIMPLE DESCRIPTION DERM: LOCATION SIMPLE: RIGHT UPPER ARM

## 2020-06-02 ASSESSMENT — LOCATION DETAILED DESCRIPTION DERM: LOCATION DETAILED: RIGHT ANTERIOR DISTAL UPPER ARM

## 2020-06-02 ASSESSMENT — LOCATION ZONE DERM: LOCATION ZONE: ARM

## 2020-06-02 NOTE — PROCEDURE: ADDITIONAL NOTES
Additional Notes: Patient has only been taking Gabapentin 300mg QD. Patient states taking it twice a day makes him groggy.
Detail Level: Detailed

## 2020-06-02 NOTE — PROCEDURE: SUTURE REMOVAL (GLOBAL PERIOD)
Detail Level: Detailed
Add 05642 Cpt? (Important Note: In 2017 The Use Of 46325 Is Being Tracked By Cms To Determine Future Global Period Reimbursement For Global Periods): yes

## 2020-07-07 ENCOUNTER — APPOINTMENT (RX ONLY)
Dept: URBAN - NONMETROPOLITAN AREA CLINIC 1 | Facility: CLINIC | Age: 80
Setting detail: DERMATOLOGY
End: 2020-07-07

## 2020-07-07 DIAGNOSIS — L57.0 ACTINIC KERATOSIS: ICD-10-CM

## 2020-07-07 DIAGNOSIS — L29.9 PRURITUS, UNSPECIFIED: ICD-10-CM | Status: IMPROVED

## 2020-07-07 PROCEDURE — ? FULL BODY SKIN EXAM - DECLINED

## 2020-07-07 PROCEDURE — 17000 DESTRUCT PREMALG LESION: CPT

## 2020-07-07 PROCEDURE — 99213 OFFICE O/P EST LOW 20 MIN: CPT | Mod: 25

## 2020-07-07 PROCEDURE — ? ADDITIONAL NOTES

## 2020-07-07 PROCEDURE — ? LIQUID NITROGEN

## 2020-07-07 PROCEDURE — ? COUNSELING

## 2020-07-07 PROCEDURE — 17003 DESTRUCT PREMALG LES 2-14: CPT

## 2020-07-07 PROCEDURE — ? PRESCRIPTION

## 2020-07-07 RX ORDER — TRIAMCINOLONE ACETONIDE 1 MG/G
CREAM TOPICAL BID
Qty: 1 | Refills: 1 | Status: ERX | COMMUNITY
Start: 2020-07-07

## 2020-07-07 RX ADMIN — TRIAMCINOLONE ACETONIDE: 1 CREAM TOPICAL at 00:00

## 2020-07-07 ASSESSMENT — LOCATION ZONE DERM
LOCATION ZONE: TRUNK
LOCATION ZONE: SCALP
LOCATION ZONE: EAR
LOCATION ZONE: ARM

## 2020-07-07 ASSESSMENT — LOCATION SIMPLE DESCRIPTION DERM
LOCATION SIMPLE: RIGHT FOREARM
LOCATION SIMPLE: LEFT EAR
LOCATION SIMPLE: RIGHT UPPER BACK
LOCATION SIMPLE: SCALP
LOCATION SIMPLE: ABDOMEN
LOCATION SIMPLE: LEFT FOREARM
LOCATION SIMPLE: RIGHT EAR

## 2020-07-07 ASSESSMENT — LOCATION DETAILED DESCRIPTION DERM
LOCATION DETAILED: LEFT INFERIOR FRONTAL SCALP
LOCATION DETAILED: EPIGASTRIC SKIN
LOCATION DETAILED: RIGHT PROXIMAL DORSAL FOREARM
LOCATION DETAILED: RIGHT MEDIAL UPPER BACK
LOCATION DETAILED: RIGHT SUPERIOR POSTERIOR HELIX
LOCATION DETAILED: LEFT SUPERIOR HELIX
LOCATION DETAILED: RIGHT SUPERIOR HELIX
LOCATION DETAILED: RIGHT INFERIOR POSTERIOR HELIX
LOCATION DETAILED: LEFT PROXIMAL DORSAL FOREARM

## 2020-07-07 NOTE — PROCEDURE: ADDITIONAL NOTES
Additional Notes: Patient consent was obtained to proceed with the visit and recommended plan of care after discussion of all risks and benefits, including the risks of COVID-19 exposure.
Detail Level: Simple
Detail Level: Detailed
Additional Notes: Continue permethrin 5% cream daily prn.\\nContinue gabapentin 300mg BID prn itching.

## 2020-07-07 NOTE — PROCEDURE: LIQUID NITROGEN
Consent: The patient's consent was obtained including but not limited to risks of crusting, scabbing, blistering, scarring, darker or lighter pigmentary change, recurrence, incomplete removal and infection.
Detail Level: Detailed
Render Post-Care Instructions In Note?: yes
Post-Care Instructions: I reviewed with the patient in detail post-care instructions. Patient is to wear sunprotection, and avoid picking at any of the treated lesions. Pt may apply Vaseline to crusted or scabbing areas.
Render Note In Bullet Format When Appropriate: No
Duration Of Freeze Thaw-Cycle (Seconds): 5

## 2020-10-07 ENCOUNTER — APPOINTMENT (RX ONLY)
Dept: URBAN - NONMETROPOLITAN AREA CLINIC 1 | Facility: CLINIC | Age: 80
Setting detail: DERMATOLOGY
End: 2020-10-07

## 2020-10-07 DIAGNOSIS — L57.0 ACTINIC KERATOSIS: ICD-10-CM

## 2020-10-07 DIAGNOSIS — L29.8 OTHER PRURITUS: ICD-10-CM

## 2020-10-07 DIAGNOSIS — L29.89 OTHER PRURITUS: ICD-10-CM

## 2020-10-07 PROCEDURE — ? LIQUID NITROGEN

## 2020-10-07 PROCEDURE — ? ADDITIONAL NOTES

## 2020-10-07 PROCEDURE — ? RECOMMENDATIONS

## 2020-10-07 PROCEDURE — 17000 DESTRUCT PREMALG LESION: CPT

## 2020-10-07 PROCEDURE — ? PRESCRIPTION

## 2020-10-07 PROCEDURE — 99213 OFFICE O/P EST LOW 20 MIN: CPT | Mod: 25

## 2020-10-07 PROCEDURE — ? COUNSELING

## 2020-10-07 PROCEDURE — 17003 DESTRUCT PREMALG LES 2-14: CPT

## 2020-10-07 RX ORDER — CETIRIZINE HCL 1 MG/ML
SOLUTION, ORAL ORAL
Qty: 30 | Refills: 0 | Status: ERX | COMMUNITY
Start: 2020-10-07

## 2020-10-07 RX ORDER — AMMONIUM LACTATE 12 G/100G
LOTION TOPICAL
Qty: 1 | Refills: 0 | Status: ERX

## 2020-10-07 RX ADMIN — Medication: at 00:00

## 2020-10-07 ASSESSMENT — LOCATION SIMPLE DESCRIPTION DERM
LOCATION SIMPLE: RIGHT HAND
LOCATION SIMPLE: LEFT EAR
LOCATION SIMPLE: UPPER BACK
LOCATION SIMPLE: LEFT FOREHEAD
LOCATION SIMPLE: LEFT FOREARM
LOCATION SIMPLE: RIGHT FOREARM
LOCATION SIMPLE: LEFT TEMPLE

## 2020-10-07 ASSESSMENT — LOCATION ZONE DERM
LOCATION ZONE: HAND
LOCATION ZONE: TRUNK
LOCATION ZONE: FACE
LOCATION ZONE: EAR
LOCATION ZONE: ARM

## 2020-10-07 ASSESSMENT — LOCATION DETAILED DESCRIPTION DERM
LOCATION DETAILED: RIGHT DISTAL ULNAR DORSAL FOREARM
LOCATION DETAILED: LEFT PROXIMAL DORSAL FOREARM
LOCATION DETAILED: LEFT INFERIOR LATERAL FOREHEAD
LOCATION DETAILED: LEFT SUPERIOR TEMPLE
LOCATION DETAILED: INFERIOR THORACIC SPINE
LOCATION DETAILED: LEFT INFERIOR POSTERIOR HELIX
LOCATION DETAILED: LEFT FOREHEAD
LOCATION DETAILED: RIGHT PROXIMAL DORSAL FOREARM
LOCATION DETAILED: LEFT SUPERIOR POSTERIOR HELIX
LOCATION DETAILED: RIGHT RADIAL DORSAL HAND

## 2020-10-07 NOTE — PROCEDURE: RECOMMENDATIONS
Recommendation Preamble: The following recommendations were made during the visit:
Recommendations (Free Text): Continue Triamcinolone cream sparingly prn itching on the arms. \\nStart taking 1.5 tablet of Zyrtec 10 mg in the evening.\\nTopical Benadryl gel was recommended for the patient when itchy.
Detail Level: Zone

## 2020-10-07 NOTE — PROCEDURE: LIQUID NITROGEN
Render Post-Care Instructions In Note?: yes
Render Note In Bullet Format When Appropriate: No
Post-Care Instructions: I reviewed with the patient in detail post-care instructions. Patient is to wear sunprotection, and avoid picking at any of the treated lesions. Pt may apply Vaseline to crusted or scabbing areas.
Duration Of Freeze Thaw-Cycle (Seconds): 5
Detail Level: Detailed
Consent: The patient's consent was obtained including but not limited to risks of crusting, scabbing, blistering, scarring, darker or lighter pigmentary change, recurrence, incomplete removal and infection.

## 2020-11-03 ENCOUNTER — APPOINTMENT (RX ONLY)
Dept: URBAN - NONMETROPOLITAN AREA CLINIC 1 | Facility: CLINIC | Age: 80
Setting detail: DERMATOLOGY
End: 2020-11-03

## 2020-11-03 DIAGNOSIS — L29.89 OTHER PRURITUS: ICD-10-CM | Status: IMPROVED

## 2020-11-03 DIAGNOSIS — L82.0 INFLAMED SEBORRHEIC KERATOSIS: ICD-10-CM

## 2020-11-03 DIAGNOSIS — L29.8 OTHER PRURITUS: ICD-10-CM | Status: IMPROVED

## 2020-11-03 PROCEDURE — ? FULL BODY SKIN EXAM - DECLINED

## 2020-11-03 PROCEDURE — 99213 OFFICE O/P EST LOW 20 MIN: CPT | Mod: 25

## 2020-11-03 PROCEDURE — ? COUNSELING

## 2020-11-03 PROCEDURE — ? LIQUID NITROGEN

## 2020-11-03 PROCEDURE — ? ADDITIONAL NOTES

## 2020-11-03 PROCEDURE — 17110 DESTRUCTION B9 LES UP TO 14: CPT

## 2020-11-03 ASSESSMENT — LOCATION DETAILED DESCRIPTION DERM
LOCATION DETAILED: LEFT PROXIMAL DORSAL FOREARM
LOCATION DETAILED: RIGHT LATERAL UPPER BACK
LOCATION DETAILED: RIGHT PROXIMAL DORSAL FOREARM
LOCATION DETAILED: SUPERIOR THORACIC SPINE

## 2020-11-03 ASSESSMENT — LOCATION ZONE DERM
LOCATION ZONE: ARM
LOCATION ZONE: TRUNK

## 2020-11-03 ASSESSMENT — LOCATION SIMPLE DESCRIPTION DERM
LOCATION SIMPLE: LEFT FOREARM
LOCATION SIMPLE: RIGHT UPPER BACK
LOCATION SIMPLE: RIGHT FOREARM
LOCATION SIMPLE: UPPER BACK

## 2020-11-03 NOTE — PROCEDURE: LIQUID NITROGEN
Detail Level: Detailed
Render Note In Bullet Format When Appropriate: No
Medical Necessity Clause: This procedure was medically necessary because the lesions that were treated were:
Consent: The patient's consent was obtained including but not limited to risks of crusting, scabbing, blistering, scarring, darker or lighter pigmentary change, recurrence, incomplete removal and infection.
Post-Care Instructions: I reviewed with the patient in detail post-care instructions. Patient is to wear sunprotection, and avoid picking at any of the treated lesions. Pt may apply Vaseline to crusted or scabbing areas.
Medical Necessity Information: It is in your best interest to select a reason for this procedure from the list below. All of these items fulfill various CMS LCD requirements except the new and changing color options.

## 2020-12-15 ENCOUNTER — APPOINTMENT (RX ONLY)
Dept: URBAN - NONMETROPOLITAN AREA CLINIC 1 | Facility: CLINIC | Age: 80
Setting detail: DERMATOLOGY
End: 2020-12-15

## 2020-12-15 DIAGNOSIS — L29.8 OTHER PRURITUS: ICD-10-CM | Status: WELL CONTROLLED

## 2020-12-15 DIAGNOSIS — L29.89 OTHER PRURITUS: ICD-10-CM | Status: WELL CONTROLLED

## 2020-12-15 PROBLEM — L29.9 PRURITUS, UNSPECIFIED: Status: ACTIVE | Noted: 2020-12-15

## 2020-12-15 PROCEDURE — ? COUNSELING

## 2020-12-15 PROCEDURE — ? FULL BODY SKIN EXAM - DECLINED

## 2020-12-15 PROCEDURE — 99213 OFFICE O/P EST LOW 20 MIN: CPT

## 2020-12-15 PROCEDURE — ? ADDITIONAL NOTES

## 2020-12-15 PROCEDURE — ? PRESCRIPTION

## 2020-12-15 RX ORDER — TRIAMCINOLONE ACETONIDE 1 MG/G
CREAM TOPICAL BID
Qty: 1 | Refills: 3 | Status: ERX

## 2020-12-15 RX ORDER — AMMONIUM LACTATE 12 G/100G
LOTION TOPICAL
Qty: 1 | Refills: 11 | Status: ERX

## 2020-12-15 RX ORDER — CETIRIZINE HCL 1 MG/ML
SOLUTION, ORAL ORAL QD
Qty: 30 | Refills: 11 | Status: ERX

## 2020-12-15 ASSESSMENT — LOCATION SIMPLE DESCRIPTION DERM
LOCATION SIMPLE: LEFT THIGH
LOCATION SIMPLE: LEFT UPPER BACK
LOCATION SIMPLE: LEFT UPPER ARM
LOCATION SIMPLE: RIGHT UPPER ARM
LOCATION SIMPLE: RIGHT THIGH

## 2020-12-15 ASSESSMENT — LOCATION DETAILED DESCRIPTION DERM
LOCATION DETAILED: RIGHT ANTERIOR DISTAL THIGH
LOCATION DETAILED: LEFT MEDIAL UPPER BACK
LOCATION DETAILED: RIGHT DISTAL LATERAL POSTERIOR UPPER ARM
LOCATION DETAILED: LEFT DISTAL POSTERIOR UPPER ARM
LOCATION DETAILED: LEFT ANTERIOR DISTAL THIGH

## 2020-12-15 ASSESSMENT — LOCATION ZONE DERM
LOCATION ZONE: ARM
LOCATION ZONE: LEG
LOCATION ZONE: TRUNK

## 2020-12-15 NOTE — PROCEDURE: ADDITIONAL NOTES
Additional Notes: Patient consent was obtained to proceed with the visit and recommended plan of care after discussion of all risks and benefits, including the risks of COVID-19 exposure.
Detail Level: Simple
Additional Notes: Continue applying ammonia lactate 12% lotion to trunk and limbs bid.\\nContinue Zyrtec 10mg PO qhs\\nRecommended cerve anti itch lotion and body wash
Detail Level: Detailed

## 2021-01-01 ENCOUNTER — APPOINTMENT (RX ONLY)
Dept: URBAN - NONMETROPOLITAN AREA CLINIC 1 | Facility: CLINIC | Age: 81
Setting detail: DERMATOLOGY
End: 2021-01-01

## 2021-01-01 DIAGNOSIS — L29.8 OTHER PRURITUS: ICD-10-CM | Status: INADEQUATELY CONTROLLED

## 2021-01-01 DIAGNOSIS — L29.89 OTHER PRURITUS: ICD-10-CM | Status: INADEQUATELY CONTROLLED

## 2021-01-01 PROCEDURE — ? TREATMENT REGIMEN

## 2021-01-01 PROCEDURE — ? PRESCRIPTION

## 2021-01-01 PROCEDURE — ? ADDITIONAL NOTES

## 2021-01-01 PROCEDURE — ? FULL BODY SKIN EXAM - DECLINED

## 2021-01-01 PROCEDURE — ? COUNSELING

## 2021-01-01 PROCEDURE — 99214 OFFICE O/P EST MOD 30 MIN: CPT

## 2021-01-01 RX ORDER — CLOBETASOL PROPIONATE 0.5 MG/ML
SOLUTION TOPICAL
Qty: 50 | Refills: 2 | Status: ERX | COMMUNITY
Start: 2021-01-01

## 2021-01-01 RX ORDER — PERMETHRIN 50 MG/G
CREAM TOPICAL
Qty: 1 | Refills: 2 | Status: ERX | COMMUNITY
Start: 2021-01-01

## 2021-01-01 RX ORDER — AMMONIUM LACTATE 12 G/100G
LOTION TOPICAL
Qty: 1 | Refills: 11 | Status: ERX

## 2021-01-01 RX ADMIN — CLOBETASOL PROPIONATE: 0.5 SOLUTION TOPICAL at 00:00

## 2021-01-01 RX ADMIN — PERMETHRIN: 50 CREAM TOPICAL at 00:00

## 2021-01-01 ASSESSMENT — LOCATION SIMPLE DESCRIPTION DERM
LOCATION SIMPLE: LEFT UPPER ARM
LOCATION SIMPLE: LEFT PRETIBIAL REGION
LOCATION SIMPLE: UPPER BACK
LOCATION SIMPLE: RIGHT PRETIBIAL REGION
LOCATION SIMPLE: RIGHT UPPER ARM

## 2021-01-01 ASSESSMENT — LOCATION ZONE DERM
LOCATION ZONE: ARM
LOCATION ZONE: LEG
LOCATION ZONE: TRUNK

## 2021-01-01 ASSESSMENT — LOCATION DETAILED DESCRIPTION DERM
LOCATION DETAILED: LEFT PROXIMAL PRETIBIAL REGION
LOCATION DETAILED: LEFT DISTAL POSTERIOR UPPER ARM
LOCATION DETAILED: RIGHT DISTAL POSTERIOR UPPER ARM
LOCATION DETAILED: RIGHT PROXIMAL PRETIBIAL REGION
LOCATION DETAILED: SUPERIOR THORACIC SPINE

## 2021-12-13 PROBLEM — L29.8 OTHER PRURITUS: Status: ACTIVE | Noted: 2021-01-01

## 2021-12-13 NOTE — PROCEDURE: TREATMENT REGIMEN
Detail Level: Detailed
Continue Regimen: Ammonium lactate 12% lotion \\nTriamcinolone 0.1% topical cream

## 2022-01-01 ENCOUNTER — APPOINTMENT (RX ONLY)
Dept: URBAN - NONMETROPOLITAN AREA CLINIC 1 | Facility: CLINIC | Age: 82
Setting detail: DERMATOLOGY
End: 2022-01-01

## 2022-01-01 ENCOUNTER — RX ONLY (OUTPATIENT)
Age: 82
Setting detail: RX ONLY
End: 2022-01-01

## 2022-01-01 DIAGNOSIS — L29.8 OTHER PRURITUS: ICD-10-CM | Status: IMPROVED

## 2022-01-01 DIAGNOSIS — L29.89 OTHER PRURITUS: ICD-10-CM | Status: IMPROVED

## 2022-01-01 DIAGNOSIS — L57.0 ACTINIC KERATOSIS: ICD-10-CM

## 2022-01-01 PROCEDURE — ? ADDITIONAL NOTES

## 2022-01-01 PROCEDURE — 17003 DESTRUCT PREMALG LES 2-14: CPT

## 2022-01-01 PROCEDURE — ? COUNSELING

## 2022-01-01 PROCEDURE — ? LIQUID NITROGEN

## 2022-01-01 PROCEDURE — ? PRESCRIPTION MEDICATION MANAGEMENT

## 2022-01-01 PROCEDURE — 99213 OFFICE O/P EST LOW 20 MIN: CPT | Mod: 25

## 2022-01-01 PROCEDURE — ? FULL BODY SKIN EXAM - DECLINED

## 2022-01-01 PROCEDURE — 17000 DESTRUCT PREMALG LESION: CPT

## 2022-01-01 RX ORDER — CLOBETASOL PROPIONATE 0.5 MG/ML
SOLUTION TOPICAL
Qty: 50 | Refills: 2 | Status: ERX

## 2022-01-01 ASSESSMENT — LOCATION SIMPLE DESCRIPTION DERM
LOCATION SIMPLE: LEFT CHEEK
LOCATION SIMPLE: LEFT FOREARM
LOCATION SIMPLE: RIGHT HAND
LOCATION SIMPLE: RIGHT CHEEK
LOCATION SIMPLE: NOSE
LOCATION SIMPLE: LEFT FOREHEAD
LOCATION SIMPLE: LEFT HAND
LOCATION SIMPLE: RIGHT FOREARM

## 2022-01-01 ASSESSMENT — LOCATION DETAILED DESCRIPTION DERM
LOCATION DETAILED: RIGHT SUPERIOR CENTRAL MALAR CHEEK
LOCATION DETAILED: LEFT INFERIOR LATERAL FOREHEAD
LOCATION DETAILED: LEFT ULNAR DORSAL HAND
LOCATION DETAILED: RIGHT DISTAL DORSAL FOREARM
LOCATION DETAILED: RIGHT RADIAL DORSAL HAND
LOCATION DETAILED: LEFT DISTAL DORSAL FOREARM
LOCATION DETAILED: LEFT CENTRAL MALAR CHEEK
LOCATION DETAILED: NASAL DORSUM

## 2022-01-01 ASSESSMENT — LOCATION ZONE DERM
LOCATION ZONE: FACE
LOCATION ZONE: NOSE
LOCATION ZONE: HAND
LOCATION ZONE: ARM

## 2022-01-26 PROBLEM — L57.0 ACTINIC KERATOSIS: Status: ACTIVE | Noted: 2022-01-01

## 2022-01-26 PROBLEM — L29.8 OTHER PRURITUS: Status: ACTIVE | Noted: 2022-01-01

## 2022-01-26 NOTE — PROCEDURE: LIQUID NITROGEN
Show Aperture Variable?: Yes
Render Note In Bullet Format When Appropriate: No
Post-Care Instructions: I reviewed with the patient in detail post-care instructions. Patient is to wear sunprotection, and avoid picking at any of the treated lesions. Pt may apply Vaseline to crusted or scabbing areas.
Duration Of Freeze Thaw-Cycle (Seconds): 5
Detail Level: Detailed
Consent: The patient's consent was obtained including but not limited to risks of crusting, scabbing, blistering, scarring, darker or lighter pigmentary change, recurrence, incomplete removal and infection.

## 2022-01-26 NOTE — PROCEDURE: PRESCRIPTION MEDICATION MANAGEMENT
Detail Level: Zone
Render In Strict Bullet Format?: No
Continue Regimen: Clobetasol scalp solution mix with a jar of CeraVe Ant itch lotion apply to aa

## 2022-09-09 NOTE — PROCEDURE: EXCISION
Referral was signed by provider.  No changes. Standing INR order has been updated.   Complex Repair And Xenograft Text: The defect edges were debeveled with a #15 scalpel blade.  The primary defect was closed partially with a complex linear closure.  Given the location of the defect, shape of the defect and the proximity to free margins a xenograft was deemed most appropriate to repair the remaining defect.  The graft was trimmed to fit the size of the remaining defect.  The graft was then placed in the primary defect, oriented appropriately, and sutured into place.

## 2023-02-01 NOTE — PROCEDURE: MOHS SURGERY
- DhO9t=4.1%. FS well controlled. Continue with ISS for now and continue to monitor FS closely.   - home regimen: Basaglar 5U qhs + Humalog 4U TID qac  - will c/w Lantus 12units qhs + hold premeal standing Admelog 4U (FS have been low), SSI qac and hs  - if NPO: decrease Lantus by 50% (7U at bedtime) and hold premeal Admelog, SSI n6ethcf  - CC + Renal restricted diet Mohs Case Number: [id number] Mohs Case Number: [id number]

## 2024-01-12 NOTE — PROCEDURE: MEDICATION COUNSELING
[Alert] : alert [No Acute Distress] : no acute distress [Normocephalic] : normocephalic [EOMI Bilateral] : EOMI bilateral [Clear tympanic membranes with bony landmarks and light reflex present bilaterally] : clear tympanic membranes with bony landmarks and light reflex present bilaterally  [Pink Nasal Mucosa] : pink nasal mucosa [Nonerythematous Oropharynx] : nonerythematous oropharynx [Supple, full passive range of motion] : supple, full passive range of motion [No Palpable Masses] : no palpable masses [Clear to Auscultation Bilaterally] : clear to auscultation bilaterally [Regular Rate and Rhythm] : regular rate and rhythm [Normal S1, S2 audible] : normal S1, S2 audible [No Murmurs] : no murmurs [+2 Femoral Pulses] : +2 femoral pulses [Soft] : soft [NonTender] : non tender [Non Distended] : non distended [Normoactive Bowel Sounds] : normoactive bowel sounds [No Hepatomegaly] : no hepatomegaly [No Splenomegaly] : no splenomegaly [No Abnormal Lymph Nodes Palpated] : no abnormal lymph nodes palpated [Normal Muscle Tone] : normal muscle tone [No Gait Asymmetry] : no gait asymmetry [No pain or deformities with palpation of bone, muscles, joints] : no pain or deformities with palpation of bone, muscles, joints [Straight] : straight [+2 Patella DTR] : +2 patella DTR [Cranial Nerves Grossly Intact] : cranial nerves grossly intact [No Rash or Lesions] : no rash or lesions Bexarotene Pregnancy And Lactation Text: This medication is Pregnancy Category X and should not be given to women who are pregnant or may become pregnant. This medication should not be used if you are breast feeding.